# Patient Record
Sex: FEMALE | Race: WHITE | Employment: FULL TIME | URBAN - METROPOLITAN AREA
[De-identification: names, ages, dates, MRNs, and addresses within clinical notes are randomized per-mention and may not be internally consistent; named-entity substitution may affect disease eponyms.]

---

## 2017-08-03 ENCOUNTER — ANESTHESIA EVENT (OUTPATIENT)
Dept: SURGERY | Age: 27
End: 2017-08-03
Payer: COMMERCIAL

## 2017-08-04 ENCOUNTER — ANESTHESIA (OUTPATIENT)
Dept: SURGERY | Age: 27
End: 2017-08-04
Payer: COMMERCIAL

## 2017-08-04 ENCOUNTER — HOSPITAL ENCOUNTER (OUTPATIENT)
Age: 27
Setting detail: OUTPATIENT SURGERY
Discharge: HOME | End: 2017-08-04
Attending: ORTHOPAEDIC SURGERY | Admitting: ORTHOPAEDIC SURGERY
Payer: COMMERCIAL

## 2017-08-04 VITALS
HEART RATE: 90 BPM | WEIGHT: 143 LBS | RESPIRATION RATE: 16 BRPM | TEMPERATURE: 97.5 F | BODY MASS INDEX: 23.8 KG/M2 | DIASTOLIC BLOOD PRESSURE: 79 MMHG | SYSTOLIC BLOOD PRESSURE: 120 MMHG | OXYGEN SATURATION: 98 %

## 2017-08-04 LAB — HCG UR QL: NEGATIVE

## 2017-08-04 PROCEDURE — 77030006590 HC BLD ARTHSC GRFT J&J -C: Performed by: ORTHOPAEDIC SURGERY

## 2017-08-04 PROCEDURE — 76210000006 HC OR PH I REC 0.5 TO 1 HR: Performed by: ORTHOPAEDIC SURGERY

## 2017-08-04 PROCEDURE — 77030002912 HC SUT ETHBND J&J -A: Performed by: ORTHOPAEDIC SURGERY

## 2017-08-04 PROCEDURE — 76010000162 HC OR TIME 1.5 TO 2 HR INTENSV-TIER 1: Performed by: ORTHOPAEDIC SURGERY

## 2017-08-04 PROCEDURE — 77030003602 HC NDL NRV BLK BBMI -B: Performed by: ANESTHESIOLOGY

## 2017-08-04 PROCEDURE — 77030020143 HC AIRWY LARYN INTUB CGAS -A: Performed by: ANESTHESIOLOGY

## 2017-08-04 PROCEDURE — 77030011640 HC PAD GRND REM COVD -A: Performed by: ORTHOPAEDIC SURGERY

## 2017-08-04 PROCEDURE — 77030018986 HC SUT ETHBND4 J&J -B: Performed by: ORTHOPAEDIC SURGERY

## 2017-08-04 PROCEDURE — 77030006668 HC BLD SHV MENSCS STRY -B: Performed by: ORTHOPAEDIC SURGERY

## 2017-08-04 PROCEDURE — 77030034185 HC GD PIN FLX VERSITOMIC STRY -E: Performed by: ORTHOPAEDIC SURGERY

## 2017-08-04 PROCEDURE — 77030002966 HC SUT PDS J&J -A: Performed by: ORTHOPAEDIC SURGERY

## 2017-08-04 PROCEDURE — 77030027384 HC PRB ARTHSCP SERFAS STRY -C: Performed by: ORTHOPAEDIC SURGERY

## 2017-08-04 PROCEDURE — 74011250637 HC RX REV CODE- 250/637: Performed by: ANESTHESIOLOGY

## 2017-08-04 PROCEDURE — 77030003666 HC NDL SPINAL BD -A: Performed by: ORTHOPAEDIC SURGERY

## 2017-08-04 PROCEDURE — 77030035239: Performed by: ORTHOPAEDIC SURGERY

## 2017-08-04 PROCEDURE — 77030006788 HC BLD SAW OSC STRY -B: Performed by: ORTHOPAEDIC SURGERY

## 2017-08-04 PROCEDURE — 74011250636 HC RX REV CODE- 250/636: Performed by: ANESTHESIOLOGY

## 2017-08-04 PROCEDURE — 77030033005 HC TBNG ARTHSC PMP STRY -B: Performed by: ORTHOPAEDIC SURGERY

## 2017-08-04 PROCEDURE — C1713 ANCHOR/SCREW BN/BN,TIS/BN: HCPCS | Performed by: ORTHOPAEDIC SURGERY

## 2017-08-04 PROCEDURE — 76210000021 HC REC RM PH II 0.5 TO 1 HR: Performed by: ORTHOPAEDIC SURGERY

## 2017-08-04 PROCEDURE — 74011250636 HC RX REV CODE- 250/636: Performed by: ORTHOPAEDIC SURGERY

## 2017-08-04 PROCEDURE — 74011250636 HC RX REV CODE- 250/636

## 2017-08-04 PROCEDURE — 81025 URINE PREGNANCY TEST: CPT

## 2017-08-04 PROCEDURE — 76010010054 HC POST OP PAIN BLOCK: Performed by: ORTHOPAEDIC SURGERY

## 2017-08-04 PROCEDURE — 76942 ECHO GUIDE FOR BIOPSY: CPT | Performed by: ORTHOPAEDIC SURGERY

## 2017-08-04 PROCEDURE — 74011000250 HC RX REV CODE- 250

## 2017-08-04 PROCEDURE — 77030018673: Performed by: ORTHOPAEDIC SURGERY

## 2017-08-04 PROCEDURE — 77030018836 HC SOL IRR NACL ICUM -A: Performed by: ORTHOPAEDIC SURGERY

## 2017-08-04 PROCEDURE — 76060000034 HC ANESTHESIA 1.5 TO 2 HR: Performed by: ORTHOPAEDIC SURGERY

## 2017-08-04 PROCEDURE — 77030000032 HC CUF TRNQT ZIMM -B: Performed by: ORTHOPAEDIC SURGERY

## 2017-08-04 PROCEDURE — 77030002933 HC SUT MCRYL J&J -A: Performed by: ORTHOPAEDIC SURGERY

## 2017-08-04 PROCEDURE — 77030002991 HC SUT QUILL SSPC -B: Performed by: ORTHOPAEDIC SURGERY

## 2017-08-04 DEVICE — SCREW INTRF SHT THRD 8X23 MM KNEE CRUC DRVR BIOSTEON: Type: IMPLANTABLE DEVICE | Site: KNEE | Status: FUNCTIONAL

## 2017-08-04 DEVICE — SCREW INTFR L23MM DIA7MM KNEE PLLA WDG SHP ROUNDED THRD: Type: IMPLANTABLE DEVICE | Site: KNEE | Status: FUNCTIONAL

## 2017-08-04 RX ORDER — LIDOCAINE HYDROCHLORIDE 20 MG/ML
INJECTION, SOLUTION EPIDURAL; INFILTRATION; INTRACAUDAL; PERINEURAL AS NEEDED
Status: DISCONTINUED | OUTPATIENT
Start: 2017-08-04 | End: 2017-08-04 | Stop reason: HOSPADM

## 2017-08-04 RX ORDER — SODIUM CHLORIDE, SODIUM LACTATE, POTASSIUM CHLORIDE, CALCIUM CHLORIDE 600; 310; 30; 20 MG/100ML; MG/100ML; MG/100ML; MG/100ML
75 INJECTION, SOLUTION INTRAVENOUS CONTINUOUS
Status: DISCONTINUED | OUTPATIENT
Start: 2017-08-04 | End: 2017-08-04 | Stop reason: HOSPADM

## 2017-08-04 RX ORDER — HYDROMORPHONE HYDROCHLORIDE 2 MG/ML
0.5 INJECTION, SOLUTION INTRAMUSCULAR; INTRAVENOUS; SUBCUTANEOUS
Status: DISCONTINUED | OUTPATIENT
Start: 2017-08-04 | End: 2017-08-04 | Stop reason: HOSPADM

## 2017-08-04 RX ORDER — DEXAMETHASONE SODIUM PHOSPHATE 4 MG/ML
INJECTION, SOLUTION INTRA-ARTICULAR; INTRALESIONAL; INTRAMUSCULAR; INTRAVENOUS; SOFT TISSUE AS NEEDED
Status: DISCONTINUED | OUTPATIENT
Start: 2017-08-04 | End: 2017-08-04 | Stop reason: HOSPADM

## 2017-08-04 RX ORDER — PROPOFOL 10 MG/ML
INJECTION, EMULSION INTRAVENOUS AS NEEDED
Status: DISCONTINUED | OUTPATIENT
Start: 2017-08-04 | End: 2017-08-04 | Stop reason: HOSPADM

## 2017-08-04 RX ORDER — FENTANYL CITRATE 50 UG/ML
INJECTION, SOLUTION INTRAMUSCULAR; INTRAVENOUS AS NEEDED
Status: DISCONTINUED | OUTPATIENT
Start: 2017-08-04 | End: 2017-08-04 | Stop reason: HOSPADM

## 2017-08-04 RX ORDER — ONDANSETRON 2 MG/ML
INJECTION INTRAMUSCULAR; INTRAVENOUS AS NEEDED
Status: DISCONTINUED | OUTPATIENT
Start: 2017-08-04 | End: 2017-08-04 | Stop reason: HOSPADM

## 2017-08-04 RX ORDER — FENTANYL CITRATE 50 UG/ML
100 INJECTION, SOLUTION INTRAMUSCULAR; INTRAVENOUS ONCE
Status: COMPLETED | OUTPATIENT
Start: 2017-08-04 | End: 2017-08-04

## 2017-08-04 RX ORDER — FAMOTIDINE 20 MG/1
20 TABLET, FILM COATED ORAL ONCE
Status: COMPLETED | OUTPATIENT
Start: 2017-08-04 | End: 2017-08-04

## 2017-08-04 RX ORDER — SODIUM CHLORIDE 0.9 % (FLUSH) 0.9 %
5-10 SYRINGE (ML) INJECTION AS NEEDED
Status: CANCELLED | OUTPATIENT
Start: 2017-08-04

## 2017-08-04 RX ORDER — SODIUM CHLORIDE 0.9 % (FLUSH) 0.9 %
5-10 SYRINGE (ML) INJECTION EVERY 8 HOURS
Status: DISCONTINUED | OUTPATIENT
Start: 2017-08-04 | End: 2017-08-04 | Stop reason: HOSPADM

## 2017-08-04 RX ORDER — SODIUM CHLORIDE 0.9 % (FLUSH) 0.9 %
5-10 SYRINGE (ML) INJECTION EVERY 8 HOURS
Status: CANCELLED | OUTPATIENT
Start: 2017-08-04

## 2017-08-04 RX ORDER — LIDOCAINE HYDROCHLORIDE 10 MG/ML
0.1 INJECTION INFILTRATION; PERINEURAL AS NEEDED
Status: DISCONTINUED | OUTPATIENT
Start: 2017-08-04 | End: 2017-08-04 | Stop reason: HOSPADM

## 2017-08-04 RX ORDER — BUPIVACAINE HYDROCHLORIDE AND EPINEPHRINE 5; 5 MG/ML; UG/ML
INJECTION, SOLUTION EPIDURAL; INTRACAUDAL; PERINEURAL AS NEEDED
Status: DISCONTINUED | OUTPATIENT
Start: 2017-08-04 | End: 2017-08-04 | Stop reason: HOSPADM

## 2017-08-04 RX ORDER — MIDAZOLAM HYDROCHLORIDE 1 MG/ML
2 INJECTION, SOLUTION INTRAMUSCULAR; INTRAVENOUS ONCE
Status: COMPLETED | OUTPATIENT
Start: 2017-08-04 | End: 2017-08-04

## 2017-08-04 RX ORDER — OXYCODONE HYDROCHLORIDE 5 MG/1
10 TABLET ORAL
Status: DISCONTINUED | OUTPATIENT
Start: 2017-08-04 | End: 2017-08-04 | Stop reason: HOSPADM

## 2017-08-04 RX ORDER — CEFAZOLIN SODIUM IN 0.9 % NACL 2 G/50 ML
2 INTRAVENOUS SOLUTION, PIGGYBACK (ML) INTRAVENOUS ONCE
Status: COMPLETED | OUTPATIENT
Start: 2017-08-04 | End: 2017-08-04

## 2017-08-04 RX ORDER — SODIUM CHLORIDE 0.9 % (FLUSH) 0.9 %
5-10 SYRINGE (ML) INJECTION AS NEEDED
Status: DISCONTINUED | OUTPATIENT
Start: 2017-08-04 | End: 2017-08-04 | Stop reason: HOSPADM

## 2017-08-04 RX ORDER — OXYCODONE HYDROCHLORIDE 5 MG/1
5 TABLET ORAL
Status: DISCONTINUED | OUTPATIENT
Start: 2017-08-04 | End: 2017-08-04 | Stop reason: HOSPADM

## 2017-08-04 RX ADMIN — LIDOCAINE HYDROCHLORIDE 100 MG: 20 INJECTION, SOLUTION EPIDURAL; INFILTRATION; INTRACAUDAL; PERINEURAL at 13:21

## 2017-08-04 RX ADMIN — DEXAMETHASONE SODIUM PHOSPHATE 10 MG: 4 INJECTION, SOLUTION INTRA-ARTICULAR; INTRALESIONAL; INTRAMUSCULAR; INTRAVENOUS; SOFT TISSUE at 13:44

## 2017-08-04 RX ADMIN — ONDANSETRON 4 MG: 2 INJECTION INTRAMUSCULAR; INTRAVENOUS at 13:43

## 2017-08-04 RX ADMIN — FAMOTIDINE 20 MG: 20 TABLET, FILM COATED ORAL at 12:21

## 2017-08-04 RX ADMIN — CEFAZOLIN 2 G: 1 INJECTION, POWDER, FOR SOLUTION INTRAMUSCULAR; INTRAVENOUS; PARENTERAL at 13:15

## 2017-08-04 RX ADMIN — SODIUM CHLORIDE, SODIUM LACTATE, POTASSIUM CHLORIDE, AND CALCIUM CHLORIDE: 600; 310; 30; 20 INJECTION, SOLUTION INTRAVENOUS at 13:54

## 2017-08-04 RX ADMIN — BUPIVACAINE HYDROCHLORIDE AND EPINEPHRINE 40 ML: 5; 5 INJECTION, SOLUTION EPIDURAL; INTRACAUDAL; PERINEURAL at 13:04

## 2017-08-04 RX ADMIN — FENTANYL CITRATE 50 MCG: 50 INJECTION, SOLUTION INTRAMUSCULAR; INTRAVENOUS at 13:25

## 2017-08-04 RX ADMIN — SODIUM CHLORIDE, SODIUM LACTATE, POTASSIUM CHLORIDE, AND CALCIUM CHLORIDE: 600; 310; 30; 20 INJECTION, SOLUTION INTRAVENOUS at 13:14

## 2017-08-04 RX ADMIN — MIDAZOLAM HYDROCHLORIDE 2 MG: 1 INJECTION, SOLUTION INTRAMUSCULAR; INTRAVENOUS at 13:05

## 2017-08-04 RX ADMIN — MIDAZOLAM HYDROCHLORIDE 2 MG: 1 INJECTION, SOLUTION INTRAMUSCULAR; INTRAVENOUS at 13:15

## 2017-08-04 RX ADMIN — PROPOFOL 200 MG: 10 INJECTION, EMULSION INTRAVENOUS at 13:21

## 2017-08-04 RX ADMIN — FENTANYL CITRATE 100 MCG: 50 INJECTION, SOLUTION INTRAMUSCULAR; INTRAVENOUS at 13:05

## 2017-08-04 RX ADMIN — SODIUM CHLORIDE, SODIUM LACTATE, POTASSIUM CHLORIDE, AND CALCIUM CHLORIDE 75 ML/HR: 600; 310; 30; 20 INJECTION, SOLUTION INTRAVENOUS at 12:21

## 2017-08-04 RX ADMIN — FENTANYL CITRATE 50 MCG: 50 INJECTION, SOLUTION INTRAMUSCULAR; INTRAVENOUS at 13:33

## 2017-08-04 NOTE — ANESTHESIA PREPROCEDURE EVALUATION
Anesthetic History               Review of Systems / Medical History  Patient summary reviewed and pertinent labs reviewed    Pulmonary                   Neuro/Psych              Cardiovascular                  Exercise tolerance: >4 METS     GI/Hepatic/Renal                Endo/Other             Other Findings              Physical Exam    Airway  Mallampati: I  TM Distance: > 6 cm  Neck ROM: normal range of motion   Mouth opening: Normal     Cardiovascular  Regular rate and rhythm,  S1 and S2 normal,  no murmur, click, rub, or gallop  Rhythm: regular  Rate: normal         Dental  No notable dental hx       Pulmonary  Breath sounds clear to auscultation               Abdominal         Other Findings            Anesthetic Plan    ASA: 1  Anesthesia type: general      Post-op pain plan if not by surgeon: peripheral nerve block single      Anesthetic plan and risks discussed with: Patient

## 2017-08-04 NOTE — H&P
Outpatient Surgery History and Physical:  Bailey Batres was seen and examined. CHIEF COMPLAINT:   Left knee instability and pain. PE:     Visit Vitals    /70 (BP 1 Location: Right arm, BP Patient Position: Sitting)    Pulse 74    Temp 98.3 °F (36.8 °C)    Resp 18    Wt 64.9 kg (143 lb)    LMP 07/14/2017    SpO2 97%    BMI 23.8 kg/m2       Heart:   Regular rhythm      Lungs:  Are clear      Past Medical History: There are no active problems to display for this patient. Surgical History: History reviewed. No pertinent surgical history. Social History: Patient  reports that she has never smoked. She has never used smokeless tobacco. She reports that she drinks alcohol. She reports that she does not use illicit drugs. Family History:   Family History   Problem Relation Age of Onset    No Known Problems Mother     No Known Problems Father     No Known Problems Brother        Allergies: Reviewed per EMR  No Known Allergies    Medications:    No current facility-administered medications on file prior to encounter. Current Outpatient Prescriptions on File Prior to Encounter   Medication Sig    [address] [facility], , 0.25-35 mg-mcg per tablet        The surgery is planned for the left knee. History and physical has been reviewed. The patient has been examined. There have been no significant clinical changes since the completion of the originally dated History and Physical.  Patient identified by surgeon; surgical site was confirmed by patient and surgeon. The patient is here today for outpatient surgery. I have examined the patient, no changes are noted in the patient's medical status. Necessity for the procedure/care is still present and the history and physical above is current. See the office notes for the full long term history of the problem. Please see the recent office notes for the musculoskeletal examination.     Signed By: Urmila Ervin MD     August 4, 2017 1:03 PM

## 2017-08-04 NOTE — ANESTHESIA PROCEDURE NOTES
Peripheral Block    Start time: 8/4/2017 1:04 PM  End time: 8/4/2017 1:08 PM  Performed by: Neymar Niño  Authorized by: Neymar Niño       Pre-procedure: Indications: at surgeon's request, post-op pain management and procedure for pain    Preanesthetic Checklist: patient identified, risks and benefits discussed, site marked, timeout performed, anesthesia consent given and patient being monitored    Timeout Time: 13:04          Block Type:   Block Type:   Adductor canal  Laterality:  Left  Monitoring:  Standard ASA monitoring, continuous pulse ox, frequent vital sign checks, heart rate, responsive to questions and oxygen  Injection Technique:  Single shot  Procedures: ultrasound guided    Patient Position: supine  Prep: chlorhexidine    Location:  Mid thigh  Needle Type:  Stimuplex  Needle Gauge:  21 G  Needle Localization:  Ultrasound guidance and anatomical landmarks  Medication Injected:  0.5%  ropivacaine and bupivacaine  Adds:  Epi 1:200K  Volume (mL):  40    Assessment:  Number of attempts:  1  Injection Assessment:  Incremental injection every 5 mL, local visualized surrounding nerve on ultrasound, negative aspiration for blood, no paresthesia, no intravascular symptoms and ultrasound image on chart  Patient tolerance:  Patient tolerated the procedure well with no immediate complications

## 2017-08-04 NOTE — IP AVS SNAPSHOT
Alberto Holloway 
 
 
 [address] [facility] [address] [facility] 
[phone] Patient: Niranjan Carvalho MRN: [id number] LYD:[birth year] Current Discharge Medication List  
  
ASK your doctor about these medications Dose & Instructions Dispensing Information Comments Morning Noon Evening Bedtime [address] [facility] () 0.25-35 mg-mcg Tab Generic drug:  norgestimate-ethinyl estradiol Your last dose was: Your next dose is:    
   
   
  Refills:  3

## 2017-08-04 NOTE — BRIEF OP NOTE
BRIEF OPERATIVE NOTE    Date of Procedure: 8/4/2017   Preoperative Diagnosis: Anterior cruciate ligament complete tear, left, initial encounter [S83.512A]  Postoperative Diagnosis: Left knee ACL tear    Procedure(s):  LEFT KNEE ARTHROSCOPY WITH  POSS ANTERIOR CRUCIATE LIGAMENT RECONSTRUCTION/ BONE TENDON BONE AUTOGRAFT/ GENERAL AND REGIONAL  Surgeon(s) and Role:     * Leandro Horne MD - Primary         Assistant Staff:       Surgical Staff:  Circ-1: Mehnaz Ortega RN  Scrub Tech-1: Deepthi Saavedra  Scrub Private/Assistant: Agnieszka Marcus  Event Time In   Incision Start 1346   Incision Close 1503     Anesthesia: General   Estimated Blood Loss: 5 ml  Specimens: * No specimens in log *   Findings: left knee acl tear, healed lateral meniscus tear. Complications: none  Implants:   Implant Name Type Inv.  Item Serial No.  Lot No. LRB No. Used Action   SCR BNE INTERFER 1.0MM 8X23MM -- [employer] - [id number]  SCR BNE INTERFER 1.0MM 8X23MM -- [employer]  [employer] [id number] Left 1 Implanted   SCR BNE INTERFER 1.0MM 7X23MM -- [employer] - [id number]   SCR BNE INTERFER 1.0MM 7X23MM -- [employer]   [employer] [id number] Left 1 Implanted

## 2017-08-04 NOTE — DISCHARGE INSTRUCTIONS
POST-OPERATIVE INFORMATION ACL RECONSTRUCTION    Returning Home  Your pain after surgery will vary depending on the method of anesthesia used and from patient to patient. In the first 24 hours, pain medication should be taken regularly with small amounts of food. During this time, nausea and light-headedness are common and should improve in 2-5 days. Drinking fluids may help. If nausea persists, medicine can be prescribed by calling your doctor at [phone]. Leaving the Outpatient Surgery Center:     As you leave the surgery center you might be given a CPM (continuous passive motion) machine and/or a Cold Therapy unit (Cryocuff or Game Ready). The CPM is used to help maintain your motion. You may remove your brace to use your CPM machine. If you are able to, sleep with the machine on. If you are not able to sleep with the CPM, sleep with the immobilizer and use the CPM machine multiple times throughout the day (4-6 hours per day). Each day you may increase the flexion setting as you tolerate. You do not have to go farther than 90 degrees of flexion if you eventually reach that. You may start the CPM on the day of your surgery and do 2-4 hours broken up as tolerated. For Cold Therapy, always have a layer in between your skin and the wrap. The cold is to be used to help control pain and swelling. Follow the instructions given to you on operating the machine. You may take the ice wrap off when you are not icing. Dont use the cold therapy while using the CPM.    For the first week:   1. When lying in bed keep your knee higher than your heart to help with swelling. 2. Use crutches when out of bed. 3. When walking, you may touch your foot to the ground for balance as you feel comfortable. 4. The cryotherapy cold sleeve, will be put on in recovery to control swelling. The position of the cryocuff is critical. Make sure the cuff is empty when you tighten it down and then fill it.  Make the top strap snug and the bottom strap looser. Re-chill the water once an hour or as needed. Take the cold therapy wrap off when going outside the home. 5. Wear immobilizer to sleep at night and when you are up and about. You may take the immobilizer off at home to work on knee and ankle motion. Care of your Incisions  1. The incision is often checked 6 to 10 days after surgery. 2. Moderate bleeding may occur at the incision sites. This should decrease quickly over time. 3. Leave the dressings from surgery in place for 3-4 days. The bulky dressings may be removed and replaced with fresh gauze at that time, but leave on the small tape strips on the incision sites. Watch the wound for increasing redness, tenderness, swelling, and pus drainage daily. These can be early signs of infection. If you notice any of these signs of infection please call at [phone]. A mild fever during the first few days after surgery is not uncommon. This often occurs and can be treated with deep breathing, coughing to clear the lungs, and walking with crutches. However, fevers, increasing pain, and swelling at the incisions should be reported immediately. Showering:   Until your first post operative visit, you should consider wrapping your knee in saran wrap with tape for showering.  A plastic chair in your shower will allow you to sit.  Sponge bathing is also an option.  In general, once cleared by your physician you may allow your incisions to get wet in the shower. Post-Operative Pain Management  ANESTHESIA: You will meet with an anesthesiologist on the day of surgery to discuss your anesthesia. You will have general anesthesia and often will have a femoral nerve block. This will wear off so be ready to begin you pain medications in order to prevent a long period without pain control. MEDICATIONS: You will be given a prescription for medications. Please take them as directed on the label and with food.     Certain pain medications may contain Tylenol(Acetaminophen). It is important not to take any additional Tylenol while on these pain medications.  Do not mix your pain medications with alcohol.  You should not drive while taking pain medications as they increase your liability and delay your responses.  Your physician will most likely prescribe to you Aspirin 325 mg (ECASA) daily for three weeks after surgery. This is done in order to help minimize the risk for a blood clot from developing, which is a possible complication after any surgery.  You will go home with white stockings on your legs called JUSTUS hose. They are used to help with swelling and blood clots. After your first visit they will usually be discontinued.  If you have any questions or concerns regarding your medications, please call the office. · Common side effects of the narcotics include nausea, vomiting, drowsiness, constipation, and difficulty urinating. If you experience constipation, drink lots of water/Gatorade, avoid soda and diet drinks. Eat plenty of fiber. You may take a stool softener: Colace 100mg twice a day for the first week. For severe constipation use magnesium citrate, one 8 oz bottle. All can be bought at the pharmacy. Diet and General Conditioning   Aerobic conditioning and diet are both very important after surgery. In general, we recommend that you make sure to avoid skipping meals, eat a balanced diet including regular portions of fruits and vegetables, and avoid relying on fast foods while you are recovering from surgery. Also, consider taking a daily multi-vitamin. Participate in some form of aerobic conditioning after surgery. Speak with your physical therapist or call our office to determine an appropriate form of exercise after surgery. Initially, your exercise will need to be modified after surgery.     Follow-up Visits   Doctor  Plan on seeing your surgeon at 1 week, 1 month, 3 months, and 6 months after surgery. If your knee does not progress as planned, you are welcome to schedule additional visits. There is usually no charge for surgery related visits 90 days following surgery. You will receive a bill for any x-rays or special equipment (such as a brace). Physical Therapy   Your Therapist may schedule more visits depending on your progress.  First visit 2-5 days after surgery.  Weekly visits from week 1 through week 6.  Then every other week for one month.  Monthly from 3 months through 6 months. Physical Therapy  Goals for the first week  1. *Maintain maximum extension (straightening). 2. * Minimize or eliminate swelling. 3. Activate the thigh muscle. 4. Achieve greater than 90 degrees of bending. 5. Promote incision healing. Home Exercises Begin performing these exercises within the first 24 hours following surgery. 1. Throughout the day, take off the immobilizer and ice machine. Prop your foot up on pillows for 10 minutes so that your knee is not supported. Allow the knee to straighten fully. Do quad sets periodically by tightening the muscle on top of the thigh so that your knee cap moves toward you. Hold for five seconds and relax. 2. Perform heel slides multiple times throughout the day. Take the brace and ice off and slide your heel toward your bottom within a comfortable range of motion; help the leg with your hands. 3. Perform straight leg lifts in the knee immobilizer a couple times a day. Start by doing a quad set (above). Then lift your entire leg off the table starting with the heel. The knee should not bend. If it does, you should not perform this exercise. 4. Perform ankle pumps by moving foot up and down. Do these throughout the day. When to stop using  Immobilizer and crutches: Your Physical Therapist will help you to determine the appropriate time to wean out of your brace and off of crutches. Use these guidelines to help.    Gradually wean off the crutches after the first 2-3 weeks. You may place as much weight on your leg in the brace with the crutches as you feel comfortable with. Begin with 2 crutches all the time, then one crutch at home and 2 outside, no crutches at home and one outside, no crutches.  When you can straighten your leg fully and do a straight leg lift you can wean off the immobilizer for day use, still sleep in it.  When your knee is straight for 3 weeks then you can wean out of the immobilizer when you sleep. Your knee must remain straight. Cold Therapy: You can discontinue using the cryocuff or game ready as the pain decreases. You may use cold therapy for control of pain and swelling. Use for 20-30 minutes at a time throughout the day as you desire. Issues after Surgery   Clicks and Pops- it is common for patients to experience sensations of clicks and pops in the first few months after surgery. It will resolve with time.  Pain around or just below the knee cap is common after surgery. It will resolve as your quad muscle strengthens with physical therapy. Phone [phone]              Fax [phone]                   DIET  · Clear liquids until no nausea or vomiting; then light diet for the first day. · Advance to regular diet on second day, unless your doctor orders otherwise. · If nausea and vomiting continues, call your doctor. AFTER ANESTHESIA   · For the first 24 hours: DO NOT Drive, Drink alcoholic beverages, or Make important decisions. · Be aware of dizziness following anesthesia and while taking pain medication.      APPOINTMENT DATE/ TIME see card    YOUR DOCTOR'S PHONE NUMBER [phone]      DISCHARGE SUMMARY from Nurse    PATIENT INSTRUCTIONS:    After general anesthesia or intravenous sedation, for 24 hours or while taking prescription Narcotics:  · Limit your activities  · Do not drive and operate hazardous machinery  · Do not make important personal or business decisions  · Do  not drink alcoholic beverages  · If you have not urinated within 8 hours after discharge, please contact your surgeon on call. *  Please give a list of your current medications to your Primary Care Provider. *  Please update this list whenever your medications are discontinued, doses are      changed, or new medications (including over-the-counter products) are added. *  Please carry medication information at all times in case of emergency situations. These are general instructions for a healthy lifestyle:    No smoking/ No tobacco products/ Avoid exposure to second hand smoke    Surgeon General's Warning:  Quitting smoking now greatly reduces serious risk to your health. Obesity, smoking, and sedentary lifestyle greatly increases your risk for illness    A healthy diet, regular physical exercise & weight monitoring are important for maintaining a healthy lifestyle    You may be retaining fluid if you have a history of heart failure or if you experience any of the following symptoms:  Weight gain of 3 pounds or more overnight or 5 pounds in a week, increased swelling in our hands or feet or shortness of breath while lying flat in bed. Please call your doctor as soon as you notice any of these symptoms; do not wait until your next office visit. Recognize signs and symptoms of STROKE:    F-face looks uneven    A-arms unable to move or move unevenly    S-speech slurred or non-existent    T-time-call 911 as soon as signs and symptoms begin-DO NOT go       Back to bed or wait to see if you get better-TIME IS BRAIN.

## 2017-08-04 NOTE — IP AVS SNAPSHOT
[address] 
[phone] Patient: Dennis Hernandez MRN: [id number] [facility]:2/0/5691 You are allergic to the following No active allergies Recent Documentation Weight BMI OB Status Smoking Status 64.9 kg 23.8 kg/m2 Having regular periods Never Smoker Emergency Contacts Name Discharge Info Relation Home Work Mobile Alo Hannah  Father [15] [phone] Naomi Hannah  Mother [14] [phone] Chaya Frias CAREGIVER [3] Boyfriend [17] [phone] About your hospitalization You were admitted on:  August 4, 2017 [facility] You were discharged on:  August 4, 2017 Unit phone number:  [phone] Why you were hospitalized Your primary diagnosis was:  Not on File Providers Seen During Your Hospitalizations Provider Role Specialty Primary office phone Maurice Escobedo MD Attending Provider Orthopedic Surgery [phone] Your Primary Care Physician (PCP) Primary Care Physician Office Phone Office Fax [phone] [facility], [address] [place] [address] [phone] Follow-up Information None Current Discharge Medication List  
  
ASK your doctor about these medications Dose & Instructions Dispensing Information Comments Morning Noon Evening Bedtime 4760 [facility] [place] (25) 0.25-35 mg-mcg Tab Generic drug:  norgestimate-ethinyl estradiol Your last dose was: Your next dose is:    
   
   
  Refills:  3 Discharge Instructions POST-OPERATIVE INFORMATION ACL RECONSTRUCTION Returning Home Your pain after surgery will vary depending on the method of anesthesia used and from patient to patient.  In the first 24 hours, pain medication should be taken regularly with small amounts of food. During this time, nausea and light-headedness are common and should improve in 2-5 days. Drinking fluids may help. If nausea persists, medicine can be prescribed by calling your doctor at [phone]. Leaving the Outpatient Surgery Center: As you leave the surgery center you might be given a CPM (continuous passive motion) machine and/or a Cold Therapy unit (Cryocuff or Game Ready). The CPM is used to help maintain your motion. You may remove your brace to use your CPM machine. If you are able to, sleep with the machine on. If you are not able to sleep with the CPM, sleep with the immobilizer and use the CPM machine multiple times throughout the day (4-6 hours per day). Each day you may increase the flexion setting as you tolerate. You do not have to go farther than 90 degrees of flexion if you eventually reach that. You may start the CPM on the day of your surgery and do 2-4 hours broken up as tolerated. For Cold Therapy, always have a layer in between your skin and the wrap. The cold is to be used to help control pain and swelling. Follow the instructions given to you on operating the machine. You may take the ice wrap off when you are not icing. Dont use the cold therapy while using the CPM. 
 
For the first week:  
1. When lying in bed keep your knee higher than your heart to help with swelling. 2. Use crutches when out of bed. 3. When walking, you may touch your foot to the ground for balance as you feel comfortable. 4. The cryotherapy cold sleeve, will be put on in recovery to control swelling. The position of the cryocuff is critical. Make sure the cuff is empty when you tighten it down and then fill it. Make the top strap snug and the bottom strap looser. Re-chill the water once an hour or as needed. Take the cold therapy wrap off when going outside the home. 5. Wear immobilizer to sleep at night and when you are up and about.  You may take the immobilizer off at home to work on knee and ankle motion. Care of your Incisions 1. The incision is often checked 6 to 10 days after surgery. 2. Moderate bleeding may occur at the incision sites. This should decrease quickly over time. 3. Leave the dressings from surgery in place for 3-4 days. The bulky dressings may be removed and replaced with fresh gauze at that time, but leave on the small tape strips on the incision sites. Watch the wound for increasing redness, tenderness, swelling, and pus drainage daily. These can be early signs of infection. If you notice any of these signs of infection please call at [phone]. A mild fever during the first few days after surgery is not uncommon. This often occurs and can be treated with deep breathing, coughing to clear the lungs, and walking with crutches. However, fevers, increasing pain, and swelling at the incisions should be reported immediately. Showering: ? Until your first post operative visit, you should consider wrapping your knee in saran wrap with tape for showering. ? A plastic chair in your shower will allow you to sit. ? Sponge bathing is also an option. ? In general, once cleared by your physician you may allow your incisions to get wet in the shower. Post-Operative Pain Management ANESTHESIA: You will meet with an anesthesiologist on the day of surgery to discuss your anesthesia. You will have general anesthesia and often will have a femoral nerve block. This will wear off so be ready to begin you pain medications in order to prevent a long period without pain control. MEDICATIONS: You will be given a prescription for medications. Please take them as directed on the label and with food. ? Certain pain medications may contain Tylenol(Acetaminophen). It is important not to take any additional Tylenol while on these pain medications. ? Do not mix your pain medications with alcohol. ? You should not drive while taking pain medications as they increase your liability and delay your responses. ? Your physician will most likely prescribe to you Aspirin 325 mg (ECASA) daily for three weeks after surgery. This is done in order to help minimize the risk for a blood clot from developing, which is a possible complication after any surgery. ? You will go home with white stockings on your legs called JUSTUS hose. They are used to help with swelling and blood clots. After your first visit they will usually be discontinued. ? If you have any questions or concerns regarding your medications, please call the office. · Common side effects of the narcotics include nausea, vomiting, drowsiness, constipation, and difficulty urinating. If you experience constipation, drink lots of water/Gatorade, avoid soda and diet drinks. Eat plenty of fiber. You may take a stool softener: Colace 100mg twice a day for the first week. For severe constipation use magnesium citrate, one 8 oz bottle. All can be bought at the pharmacy. Diet and General Conditioning Aerobic conditioning and diet are both very important after surgery. In general, we recommend that you make sure to avoid skipping meals, eat a balanced diet including regular portions of fruits and vegetables, and avoid relying on fast foods while you are recovering from surgery. Also, consider taking a daily multi-vitamin. Participate in some form of aerobic conditioning after surgery. Speak with your physical therapist or call our office to determine an appropriate form of exercise after surgery. Initially, your exercise will need to be modified after surgery. Follow-up Visits Doctor Plan on seeing your surgeon at 1 week, 1 month, 3 months, and 6 months after surgery. If your knee does not progress as planned, you are welcome to schedule additional visits.  There is usually no charge for surgery related visits 90 days following surgery. You will receive a bill for any x-rays or special equipment (such as a brace). Physical Therapy ? Your Therapist may schedule more visits depending on your progress. ? First visit 2-5 days after surgery. ? Weekly visits from week 1 through week 6. 
? Then every other week for one month. ? Monthly from 3 months through 6 months. Physical Therapy Goals for the first week 1. *Maintain maximum extension (straightening). 2. * Minimize or eliminate swelling. 3. Activate the thigh muscle. 4. Achieve greater than 90 degrees of bending. 5. Promote incision healing. Home Exercises Begin performing these exercises within the first 24 hours following surgery. 1. Throughout the day, take off the immobilizer and ice machine. Prop your foot up on pillows for 10 minutes so that your knee is not supported. Allow the knee to straighten fully. Do quad sets periodically by tightening the muscle on top of the thigh so that your knee cap moves toward you. Hold for five seconds and relax. 2. Perform heel slides multiple times throughout the day. Take the brace and ice off and slide your heel toward your bottom within a comfortable range of motion; help the leg with your hands. 3. Perform straight leg lifts in the knee immobilizer a couple times a day. Start by doing a quad set (above). Then lift your entire leg off the table starting with the heel. The knee should not bend. If it does, you should not perform this exercise. 4. Perform ankle pumps by moving foot up and down. Do these throughout the day. When to stop using Immobilizer and crutches: Your Physical Therapist will help you to determine the appropriate time to wean out of your brace and off of crutches. Use these guidelines to help. ? Gradually wean off the crutches after the first 2-3 weeks.  You may place as much weight on your leg in the brace with the crutches as you feel comfortable with. Begin with 2 crutches all the time, then one crutch at home and 2 outside, no crutches at home and one outside, no crutches. ? When you can straighten your leg fully and do a straight leg lift you can wean off the immobilizer for day use, still sleep in it. ? When your knee is straight for 3 weeks then you can wean out of the immobilizer when you sleep. Your knee must remain straight. Cold Therapy: You can discontinue using the cryocuff or game ready as the pain decreases. You may use cold therapy for control of pain and swelling. Use for 20-30 minutes at a time throughout the day as you desire. Issues after Surgery ? Clicks and Pops- it is common for patients to experience sensations of clicks and pops in the first few months after surgery. It will resolve with time. ? Pain around or just below the knee cap is common after surgery. It will resolve as your quad muscle strengthens with physical therapy. Phone [phone] Fax [phone] DIET · Clear liquids until no nausea or vomiting; then light diet for the first day. · Advance to regular diet on second day, unless your doctor orders otherwise. · If nausea and vomiting continues, call your doctor. AFTER ANESTHESIA · For the first 24 hours: DO NOT Drive, Drink alcoholic beverages, or Make important decisions. · Be aware of dizziness following anesthesia and while taking pain medication. APPOINTMENT DATE/ TIME see card YOUR DOCTOR'S PHONE NUMBER [phone] DISCHARGE SUMMARY from Nurse PATIENT INSTRUCTIONS: 
 
After general anesthesia or intravenous sedation, for 24 hours or while taking prescription Narcotics: · Limit your activities · Do not drive and operate hazardous machinery · Do not make important personal or business decisions · Do  not drink alcoholic beverages · If you have not urinated within 8 hours after discharge, please contact your surgeon on call. *  Please give a list of your current medications to your Primary Care Provider. *  Please update this list whenever your medications are discontinued, doses are 
    changed, or new medications (including over-the-counter products) are added. *  Please carry medication information at all times in case of emergency situations. These are general instructions for a healthy lifestyle: No smoking/ No tobacco products/ Avoid exposure to second hand smoke Surgeon General's Warning:  Quitting smoking now greatly reduces serious risk to your health. Obesity, smoking, and sedentary lifestyle greatly increases your risk for illness A healthy diet, regular physical exercise & weight monitoring are important for maintaining a healthy lifestyle You may be retaining fluid if you have a history of heart failure or if you experience any of the following symptoms:  Weight gain of 3 pounds or more overnight or 5 pounds in a week, increased swelling in our hands or feet or shortness of breath while lying flat in bed. Please call your doctor as soon as you notice any of these symptoms; do not wait until your next office visit. Recognize signs and symptoms of STROKE: 
 
F-face looks uneven A-arms unable to move or move unevenly S-speech slurred or non-existent T-time-call 911 as soon as signs and symptoms begin-DO NOT go Back to bed or wait to see if you get better-TIME IS BRAIN. Discharge Orders None Introducing [facility]! Arline Ley introduces [employer] patient portal. Now you can access parts of your medical record, email your doctor's office, and request medication refills online. 1. In your internet browser, go to [email or web address] 2. Click on the First Time User? Click Here link in the Sign In box. You will see the New Member Sign Up page. 3. Enter your [employer] Access Code exactly as it appears below.  You will not need to use this code after youve completed the sign-up process. If you do not sign up before the expiration date, you must request a new code. · [employer] Access Code: [email or web address] Expires: 10/25/2017  2:43 PM 
 
4. Enter the last four digits of your Social Security Number (xxxx) and Date of Birth (mm/dd/yyyy) as indicated and click Submit. You will be taken to the next sign-up page. 5. Create a [employer] ID. This will be your [employer] login ID and cannot be changed, so think of one that is secure and easy to remember. 6. Create a [employer] password. You can change your password at any time. 7. Enter your Password Reset Question and Answer. This can be used at a later time if you forget your password. 8. Enter your e-mail address. You will receive e-mail notification when new information is available in [address] 9. Click Sign Up. You can now view and download portions of your medical record. 10. Click the Download Summary menu link to download a portable copy of your medical information. If you have questions, please visit the Frequently Asked Questions section of the [employer] website. Remember, [employer] is NOT to be used for urgent needs. For medical emergencies, dial 911. Now available from your iPhone and Android! General Information Please provide this summary of care documentation to your next provider. Patient Signature:  ____________________________________________________________ Date:  ____________________________________________________________  
  
Patel Cart Provider Signature:  ____________________________________________________________ Date:  ____________________________________________________________

## 2017-08-04 NOTE — OP NOTES
Operative Note    8/4/2017     Preoperative diagnosis:  Anterior cruciate ligament complete tear, left, initial encounter [S83.512A]    Postoperative diagnosis: Left knee ACL tear    Surgeon(s) and Role:     * Maurice Escobedo MD - Primary     Assistant: Wong Sanabria CFA    Anesthesia: General , regional    Antibiotics:  Ancef 2 grams IV      Tourniquet Time:   Total Tourniquet Time Documented:  Thigh (Left) - 65 minutes  Total: Thigh (Left) - 65 minutes       Procedures:  Procedure(s):  LEFT KNEE ARTHROSCOPY WITH ANTERIOR CRUCIATE LIGAMENT RECONSTRUCTION/ BONE TENDON BONE AUTOGRAFT  21491    Findings:  1. EUA -   1+ lachman's and obviously positive pivot shift. Stable to varus and valgus. 2. PFJ - Normal  3. Medial Joint -  normal appearing meniscus, stable to probing, cartilage appeared normal  4. Lateral joint - mostly normal appearing meniscus with slight change at the posterior horn of the lateral meniscus indicating healed prior tear. Stable to probing, cartilage appeared normal  5. PCL - stable and intact  6. ACL - acute on chronic tear of acl     Indications / Consent: This is a patient who feels unstable after an ACL tear and injury. After previous discussions and treatments using both conservative and/or non-operative treatment options the patient elected to proceed with surgery due to continued symptoms. A review of the risks and benefits, including but not limited to infection, stiffness, injury to nerves and vessels, DVT, PE, MI, need for further operations and other anesthesia related risks was performed with the patient. After this review and the review of the likely outcome and potential complications of the procedure, preoperative verbal and written consents were obtained. The operative procedure and postoperative course were discussed with the patient in detail and the extremity was marked by the patient and myself. Procedure:     The patient was given their anesthetic and placed in the supine position. An EUA was performed and positive for ACL instability as noted above. A lateral post was placed next to the operative leg and the nonoperative leg was well padded and lay secured on the table. A non-sterile tourniquet was applied to the operative leg. The leg was prepped with ChloraPrep and draped in the usual fashion. Prior to the beginning of the procedure, a time-out was performed for correct surgical site identification as was marked during the pre-operative meeting. This was confirmed using the written consent and history/physical. Time-out for antibiotic dosing, timing and selection was also performed. An esmarch was used and the tourniquet was inflated to 250mmHg. An anterior incision was done. Dissection was carried out down to the paratenon which was incised. The patella tendon was identified. Using the appropriate landmarks a 10 mm ACL graft knife was used to begin the harvest of the patient's graft. The patella tendon graft was 10 mm wide. It was taken with the double knife blade. Then the operative saw was used to take a bone plug from the patella and the tibial tubercle. The plugs measured 10 mm by 15 mm and 10 by 20 mm. After the graft was taken it was prepared on the back table. The patella tendon was closed with an absorbable 0 pds suture and the paratenon with a 2-0 Monocryl after bone graft from the harvested graft was placed back into the patellar bone plug harvest site. The arthroscope was then inserted through a lateral portal and the interior of the joint was examined. The patellofemoral compartment was normal.   The medial compartment was examined next. The medial meniscus appeared normal after probing. The articular surfaces were noted to be normal.  The ACL was noted to be completely torn. The lateral compartment was examined. The lateral meniscus was noted to have what appeared like a healed posterior horn tear. The remaining ACL was removed.  A minimal notchplasty of the lateral aspect of the notch was done. The posterior aspect of the notch was well visualized. Using a 7 mm offset guide the IDEAL position of the femoral ACL footprint was determined and position of the femoral tunnel. The knee was then flexed and a flexible guide pin was drilled. The femoral depth was noted using the lateral measuring guide and then using a partialy fluted 10 mm reamer, a 25 mm bone tunnel was drilled. The tunnel was smoothed with the shaver. The guide pin was then used to pull a passing suture up into the knee. Attention was then turned to the tibial tunnel. Using the ACL guide a drill pin was placed into the center of the tibial ACL footprint. The 10 mm reamer was then used to create the tunnel. The position was checked according to the anatomy of the anterior horn of the lateral meniscus, PCL, and medial tibial eminence. The passing suture was then pulled into the tibial tunnel and the graft was then passed without difficulty. It was firmly fixed in the femoral tunnel with a 7 X 23 mm interference screw. The knee was cycled appropriately and then the graft was also fixed in the tibial tunnel with a 8 X 23 mm interference screw as well with the knee in extension and held with a posterior drawer. The knee had good range of motion. There was no impingement. The stability was quite good. The Lachman's and pivot shift tests were now negative. The position was good. The tourniquet was deflated at 64 minutes. The skin was then closed in layered fashion using 2-0 Monocryl and 2-0 Quill. A sterile dressing was applied. A brace was placed. All counts were correct. The patient was then taken to the recovery room in satisfactory condition. Post-operative plan:Post operative instructions are provided. Patient will be WBAT on the operative leg until otherwise instructed by PT or MD. They will start PT within a couple of days working on an ACL protocol.  I will talk with the family and contact them on POD # 1. Estimated Blood Loss:   5 mls    Fluids:    see anesthesia records. Implant:   Implant Name Type Inv.  Item Serial No.  Lot No. LRB No. Used Action   SCR BNE INTERFER 1.0MM 8X23MM -- [employer] - [id number]  SCR BNE INTERFER 1.0MM 8X23MM -- [employer]  [employer] [id number] Left 1 Implanted   SCR BNE INTERFER 1.0MM 7X23MM -- [employer] - [id number]   SCR BNE INTERFER 1.0MM 7X23MM -- [employer]HA   [employer] [id number] Left 1 Implanted       Closure: Primary    Complications: None    Signed By: Ivy Quintanilla MD

## 2017-08-04 NOTE — ANESTHESIA POSTPROCEDURE EVALUATION
Post-Anesthesia Evaluation and Assessment    Patient: Tal Graff MRN: [id number]  SSN: [id number]    YOB: 1990  Age: 28 y.o. Sex: female       Cardiovascular Function/Vital Signs  Visit Vitals    /61    Pulse 91    Temp 36.4 °C (97.5 °F)    Resp 16    Wt 64.9 kg (143 lb)    SpO2 99%    BMI 23.8 kg/m2       Patient is status post general anesthesia for Procedure(s):  LEFT KNEE ARTHROSCOPY WITH  POSS ANTERIOR CRUCIATE LIGAMENT RECONSTRUCTION/ BONE TENDON BONE AUTOGRAFT/ GENERAL AND REGIONAL. Nausea/Vomiting: None    Postoperative hydration reviewed and adequate. Pain:  Pain Scale 1: Numeric (0 - 10) (08/04/17 1511)  Pain Intensity 1: 0 (08/04/17 1511)   Managed    Neurological Status:   Neuro (WDL):  (drowsy) (08/04/17 1511)   At baseline    Mental Status and Level of Consciousness: Arousable    Pulmonary Status:   O2 Device:  (o2 off) (08/04/17 1526)   Adequate oxygenation and airway patent    Complications related to anesthesia: None    Post-anesthesia assessment completed.  No concerns    Signed By: Domingo Daily MD     August 4, 2017

## (undated) DEVICE — 2DE12 2-0 UNDYD MONODERM 14X14: Brand: 2DE12 2-0 UNDYD MONODERM 14X14

## (undated) DEVICE — STOCKINETTE,IMPERVIOUS,12X48,STERILE: Brand: MEDLINE

## (undated) DEVICE — SUTURE PDS II SZ 0 L27IN ABSRB VLT L26MM CT-2 1/2 CIR Z334H

## (undated) DEVICE — SKIN MARKER,REGULAR TIP WITH RULER AND LABELS: Brand: DEVON

## (undated) DEVICE — WATERPROOF, BACTERIA PROOF DRESSING WITH ABSORBENT SEE THROUGH PAD: Brand: OPSITE POST-OP VISIBLE 15X10CM CTN 20

## (undated) DEVICE — ZIMMER® STERILE DISPOSABLE TOURNIQUET CUFF WITH PLC, DUAL PORT, SINGLE BLADDER, 30 IN. (76 CM)

## (undated) DEVICE — BUTTON SWITCH PENCIL BLADE ELECTRODE, HOLSTER: Brand: EDGE

## (undated) DEVICE — PIN GUIDE FLEXI STRL 5/BX -- VERSITOMIC

## (undated) DEVICE — 4-PORT MANIFOLD: Brand: NEPTUNE 2

## (undated) DEVICE — SURGICAL PROCEDURE PACK BASIC ST FRANCIS

## (undated) DEVICE — Device

## (undated) DEVICE — (D)STRIP SKN CLSR 0.5X4IN WHT --

## (undated) DEVICE — T-DRAPE,EXTREMITY,STERILE: Brand: MEDLINE

## (undated) DEVICE — DRAPE,U/SHT,SPLIT,FILM,60X84,STERILE: Brand: MEDLINE

## (undated) DEVICE — PVC URETHRAL CATHETER: Brand: DOVER

## (undated) DEVICE — BLADE SHV CUT MENIS AGG + 4MM --

## (undated) DEVICE — STERILE HOOK LOCK LATEX FREE ELASTIC BANDAGE 6INX5YD: Brand: HOOK LOCK™

## (undated) DEVICE — SUTURE MCRYL SZ 3-0 L27IN ABSRB UD L19MM PS-2 3/8 CIR PRIM Y427H

## (undated) DEVICE — PRECISION THIN (9.0 X 0.38 X 31.0MM)

## (undated) DEVICE — SUTURE MCRYL SZ 2-0 L27IN ABSRB UD CP-1 1 L36MM 1/2 CIR REV Y266H

## (undated) DEVICE — SUT ETHBND 0 30IN SH GRN --

## (undated) DEVICE — 90-S ACCELERATOR, SUCTION PROBE, NON-BENDABLE, MAX CUT LEVEL 11: Brand: SERFAS ENERGY

## (undated) DEVICE — NDL SPNE QNCKE 18GX3.5IN LF --

## (undated) DEVICE — GOWN,PREVENTION PLUS,XL,ST,24/CS: Brand: MEDLINE

## (undated) DEVICE — INFLOW CASSETTE TUBING, DO NOT USE IF PACKAGE IS DAMAGED: Brand: CROSSFLOW

## (undated) DEVICE — SET ENDOSCP FIX ACL FOR BNE TEND RECON DISP VERSITOMIC

## (undated) DEVICE — 3M™ COBAN™ NL STERILE NON-LATEX SELF-ADHERENT WRAP, 2086S, 6 IN X 5 YD (15 CM X 4,5 M), 12 ROLLS/CASE: Brand: 3M™ COBAN™

## (undated) DEVICE — INTENDED FOR TISSUE SEPARATION, AND OTHER PROCEDURES THAT REQUIRE A SHARP SURGICAL BLADE TO PUNCTURE OR CUT.: Brand: BARD-PARKER ® STAINLESS STEEL BLADES

## (undated) DEVICE — SOLUTION IRRIG 3000ML 0.9% SOD CHL FLX CONT 0797208] ICU MEDICAL INC]

## (undated) DEVICE — 3M™ IOBAN™ 2 ANTIMICROBIAL INCISE DRAPE 6650EZ: Brand: IOBAN™ 2

## (undated) DEVICE — REM POLYHESIVE ADULT PATIENT RETURN ELECTRODE: Brand: VALLEYLAB

## (undated) DEVICE — (D)PREP SKN CHLRAPRP APPL 26ML -- CONVERT TO ITEM 371833

## (undated) DEVICE — KNIFE SURG 10MM GRFT DISP FOR ACL RECON

## (undated) DEVICE — SUTURE NONABSORBABLE BRAIDED 5-0 30 IN ETHBND EXCEL D7809